# Patient Record
Sex: MALE | Race: WHITE | NOT HISPANIC OR LATINO | URBAN - METROPOLITAN AREA
[De-identification: names, ages, dates, MRNs, and addresses within clinical notes are randomized per-mention and may not be internally consistent; named-entity substitution may affect disease eponyms.]

---

## 2024-08-01 ENCOUNTER — INPATIENT (INPATIENT)
Facility: HOSPITAL | Age: 24
LOS: 0 days | Discharge: HOME | DRG: 225 | End: 2024-08-02
Attending: STUDENT IN AN ORGANIZED HEALTH CARE EDUCATION/TRAINING PROGRAM | Admitting: HOSPITALIST
Payer: MEDICAID

## 2024-08-01 VITALS
WEIGHT: 160.06 LBS | DIASTOLIC BLOOD PRESSURE: 70 MMHG | HEART RATE: 67 BPM | RESPIRATION RATE: 16 BRPM | TEMPERATURE: 98 F | OXYGEN SATURATION: 98 % | HEIGHT: 68 IN | SYSTOLIC BLOOD PRESSURE: 114 MMHG

## 2024-08-01 DIAGNOSIS — K37 UNSPECIFIED APPENDICITIS: ICD-10-CM

## 2024-08-01 LAB
ALBUMIN SERPL ELPH-MCNC: 4.8 G/DL — SIGNIFICANT CHANGE UP (ref 3.5–5.2)
ALP SERPL-CCNC: 56 U/L — SIGNIFICANT CHANGE UP (ref 30–115)
ALT FLD-CCNC: 17 U/L — SIGNIFICANT CHANGE UP (ref 0–41)
ANION GAP SERPL CALC-SCNC: 13 MMOL/L — SIGNIFICANT CHANGE UP (ref 7–14)
APTT BLD: 28.3 SEC — SIGNIFICANT CHANGE UP (ref 27–39.2)
AST SERPL-CCNC: 22 U/L — SIGNIFICANT CHANGE UP (ref 0–41)
BASOPHILS # BLD AUTO: 0.03 K/UL — SIGNIFICANT CHANGE UP (ref 0–0.2)
BASOPHILS NFR BLD AUTO: 0.2 % — SIGNIFICANT CHANGE UP (ref 0–1)
BILIRUB SERPL-MCNC: 0.9 MG/DL — SIGNIFICANT CHANGE UP (ref 0.2–1.2)
BLD GP AB SCN SERPL QL: SIGNIFICANT CHANGE UP
BUN SERPL-MCNC: 19 MG/DL — SIGNIFICANT CHANGE UP (ref 10–20)
CALCIUM SERPL-MCNC: 9.5 MG/DL — SIGNIFICANT CHANGE UP (ref 8.4–10.4)
CHLORIDE SERPL-SCNC: 101 MMOL/L — SIGNIFICANT CHANGE UP (ref 98–110)
CO2 SERPL-SCNC: 23 MMOL/L — SIGNIFICANT CHANGE UP (ref 17–32)
CREAT SERPL-MCNC: 0.9 MG/DL — SIGNIFICANT CHANGE UP (ref 0.7–1.5)
EGFR: 125 ML/MIN/1.73M2 — SIGNIFICANT CHANGE UP
EOSINOPHIL # BLD AUTO: 0.01 K/UL — SIGNIFICANT CHANGE UP (ref 0–0.7)
EOSINOPHIL NFR BLD AUTO: 0.1 % — SIGNIFICANT CHANGE UP (ref 0–8)
GLUCOSE SERPL-MCNC: 114 MG/DL — HIGH (ref 70–99)
HCT VFR BLD CALC: 38.9 % — LOW (ref 42–52)
HGB BLD-MCNC: 13.1 G/DL — LOW (ref 14–18)
IMM GRANULOCYTES NFR BLD AUTO: 0.4 % — HIGH (ref 0.1–0.3)
INR BLD: 1.1 RATIO — SIGNIFICANT CHANGE UP (ref 0.65–1.3)
LIDOCAIN IGE QN: 13 U/L — SIGNIFICANT CHANGE UP (ref 7–60)
LYMPHOCYTES # BLD AUTO: 0.72 K/UL — LOW (ref 1.2–3.4)
LYMPHOCYTES # BLD AUTO: 5.3 % — LOW (ref 20.5–51.1)
MCHC RBC-ENTMCNC: 27.8 PG — SIGNIFICANT CHANGE UP (ref 27–31)
MCHC RBC-ENTMCNC: 33.7 G/DL — SIGNIFICANT CHANGE UP (ref 32–37)
MCV RBC AUTO: 82.6 FL — SIGNIFICANT CHANGE UP (ref 80–94)
MONOCYTES # BLD AUTO: 0.61 K/UL — HIGH (ref 0.1–0.6)
MONOCYTES NFR BLD AUTO: 4.5 % — SIGNIFICANT CHANGE UP (ref 1.7–9.3)
NEUTROPHILS # BLD AUTO: 12.05 K/UL — HIGH (ref 1.4–6.5)
NEUTROPHILS NFR BLD AUTO: 89.5 % — HIGH (ref 42.2–75.2)
NRBC # BLD: 0 /100 WBCS — SIGNIFICANT CHANGE UP (ref 0–0)
PLATELET # BLD AUTO: 260 K/UL — SIGNIFICANT CHANGE UP (ref 130–400)
PMV BLD: 9.9 FL — SIGNIFICANT CHANGE UP (ref 7.4–10.4)
POTASSIUM SERPL-MCNC: 4.1 MMOL/L — SIGNIFICANT CHANGE UP (ref 3.5–5)
POTASSIUM SERPL-SCNC: 4.1 MMOL/L — SIGNIFICANT CHANGE UP (ref 3.5–5)
PROT SERPL-MCNC: 6.7 G/DL — SIGNIFICANT CHANGE UP (ref 6–8)
PROTHROM AB SERPL-ACNC: 12.5 SEC — SIGNIFICANT CHANGE UP (ref 9.95–12.87)
RBC # BLD: 4.71 M/UL — SIGNIFICANT CHANGE UP (ref 4.7–6.1)
RBC # FLD: 12.9 % — SIGNIFICANT CHANGE UP (ref 11.5–14.5)
SODIUM SERPL-SCNC: 137 MMOL/L — SIGNIFICANT CHANGE UP (ref 135–146)
WBC # BLD: 13.47 K/UL — HIGH (ref 4.8–10.8)
WBC # FLD AUTO: 13.47 K/UL — HIGH (ref 4.8–10.8)

## 2024-08-01 PROCEDURE — 44970 LAPAROSCOPY APPENDECTOMY: CPT

## 2024-08-01 PROCEDURE — 99223 1ST HOSP IP/OBS HIGH 75: CPT | Mod: 57

## 2024-08-01 PROCEDURE — 88304 TISSUE EXAM BY PATHOLOGIST: CPT

## 2024-08-01 PROCEDURE — 99285 EMERGENCY DEPT VISIT HI MDM: CPT

## 2024-08-01 PROCEDURE — C9399: CPT

## 2024-08-01 PROCEDURE — 88304 TISSUE EXAM BY PATHOLOGIST: CPT | Mod: 26

## 2024-08-01 PROCEDURE — 74177 CT ABD & PELVIS W/CONTRAST: CPT | Mod: 26,MC

## 2024-08-01 PROCEDURE — C1889: CPT

## 2024-08-01 RX ORDER — FAMOTIDINE 40 MG/1
20 TABLET, FILM COATED ORAL ONCE
Refills: 0 | Status: COMPLETED | OUTPATIENT
Start: 2024-08-01 | End: 2024-08-01

## 2024-08-01 RX ORDER — ACETAMINOPHEN 500 MG
650 TABLET ORAL EVERY 6 HOURS
Refills: 0 | Status: DISCONTINUED | OUTPATIENT
Start: 2024-08-01 | End: 2024-08-01

## 2024-08-01 RX ORDER — DEXTROSE MONOHYDRATE, SODIUM CHLORIDE, SODIUM LACTATE, CALCIUM CHLORIDE, MAGNESIUM CHLORIDE 1.5; 538; 448; 18.4; 5.08 G/100ML; MG/100ML; MG/100ML; MG/100ML; MG/100ML
1000 SOLUTION INTRAPERITONEAL
Refills: 0 | Status: DISCONTINUED | OUTPATIENT
Start: 2024-08-01 | End: 2024-08-01

## 2024-08-01 RX ORDER — KETOROLAC TROMETHAMINE 10 MG
15 TABLET ORAL EVERY 6 HOURS
Refills: 0 | Status: DISCONTINUED | OUTPATIENT
Start: 2024-08-01 | End: 2024-08-01

## 2024-08-01 RX ORDER — PIPERACILLIN SODIUM, TAZOBACTAM SODIUM 3; .375 G/15ML; G/15ML
3.38 INJECTION, POWDER, LYOPHILIZED, FOR SOLUTION INTRAVENOUS ONCE
Refills: 0 | Status: CANCELLED | OUTPATIENT
Start: 2024-08-02 | End: 2024-08-01

## 2024-08-01 RX ORDER — ONDANSETRON HCL/PF 4 MG/2 ML
4 VIAL (ML) INJECTION ONCE
Refills: 0 | Status: COMPLETED | OUTPATIENT
Start: 2024-08-01 | End: 2024-08-01

## 2024-08-01 RX ORDER — IOHEXOL 240 MG/ML
30 INJECTION, SOLUTION INTRATHECAL; INTRAVASCULAR; INTRAVENOUS; ORAL ONCE
Refills: 0 | Status: COMPLETED | OUTPATIENT
Start: 2024-08-01 | End: 2024-08-01

## 2024-08-01 RX ORDER — PIPERACILLIN SODIUM, TAZOBACTAM SODIUM 3; .375 G/15ML; G/15ML
3.38 INJECTION, POWDER, LYOPHILIZED, FOR SOLUTION INTRAVENOUS ONCE
Refills: 0 | Status: COMPLETED | OUTPATIENT
Start: 2024-08-01 | End: 2024-08-01

## 2024-08-01 RX ORDER — ONDANSETRON HCL/PF 4 MG/2 ML
4 VIAL (ML) INJECTION ONCE
Refills: 0 | Status: DISCONTINUED | OUTPATIENT
Start: 2024-08-01 | End: 2024-08-02

## 2024-08-01 RX ORDER — PIPERACILLIN SODIUM, TAZOBACTAM SODIUM 3; .375 G/15ML; G/15ML
3.38 INJECTION, POWDER, LYOPHILIZED, FOR SOLUTION INTRAVENOUS EVERY 8 HOURS
Refills: 0 | Status: CANCELLED | OUTPATIENT
Start: 2024-08-02 | End: 2024-08-01

## 2024-08-01 RX ORDER — ENOXAPARIN SODIUM 120 MG/.8ML
30 INJECTION SUBCUTANEOUS EVERY 12 HOURS
Refills: 0 | Status: DISCONTINUED | OUTPATIENT
Start: 2024-08-01 | End: 2024-08-01

## 2024-08-01 RX ORDER — BACTERIOSTATIC SODIUM CHLORIDE 0.9 %
1000 VIAL (ML) INJECTION ONCE
Refills: 0 | Status: COMPLETED | OUTPATIENT
Start: 2024-08-01 | End: 2024-08-01

## 2024-08-01 RX ORDER — DEXTROSE MONOHYDRATE, SODIUM CHLORIDE, SODIUM LACTATE, CALCIUM CHLORIDE, MAGNESIUM CHLORIDE 1.5; 538; 448; 18.4; 5.08 G/100ML; MG/100ML; MG/100ML; MG/100ML; MG/100ML
1000 SOLUTION INTRAPERITONEAL
Refills: 0 | Status: DISCONTINUED | OUTPATIENT
Start: 2024-08-01 | End: 2024-08-02

## 2024-08-01 RX ADMIN — IOHEXOL 30 MILLILITER(S): 240 INJECTION, SOLUTION INTRATHECAL; INTRAVASCULAR; INTRAVENOUS; ORAL at 15:27

## 2024-08-01 RX ADMIN — Medication 15 MILLIGRAM(S): at 20:59

## 2024-08-01 RX ADMIN — Medication 1000 MILLILITER(S): at 15:27

## 2024-08-01 RX ADMIN — PIPERACILLIN SODIUM, TAZOBACTAM SODIUM 200 GRAM(S): 3; .375 INJECTION, POWDER, LYOPHILIZED, FOR SOLUTION INTRAVENOUS at 20:59

## 2024-08-01 RX ADMIN — Medication 4 MILLIGRAM(S): at 17:17

## 2024-08-01 RX ADMIN — Medication 4 MILLIGRAM(S): at 15:26

## 2024-08-01 RX ADMIN — FAMOTIDINE 100 MILLIGRAM(S): 40 TABLET, FILM COATED ORAL at 15:26

## 2024-08-01 RX ADMIN — Medication 2 MILLIGRAM(S): at 15:26

## 2024-08-01 NOTE — ED PROVIDER NOTE - ATTENDING CONTRIBUTION TO CARE
23-year-old male with past medical history of testicular torsion surgery at age 7 years old, presents to the ED for abdominal pain that began last night and started to worsen this afternoon.  Patient states the pain started around the epigastric/periumbilical area, described as a twisting, sharp discomfort that radiated to the right lower quadrant region today.  States he is having nausea and vomiting x4, but denies any diarrhea, denies any fever/chills/dysuria/flank pain/rashes/trauma/headache/chest pain/shortness of breath.  On exam patient has positive McBurney's point tenderness, nondistended, no mass, normal bowel sounds, + mild epigastric/periumbilical discomfort.  High suspicion of appendicitis

## 2024-08-01 NOTE — DISCHARGE NOTE PROVIDER - CARE PROVIDER_API CALL
Gm Cruz Clark  Surgery  [phone] [place], [place] [id number]  Phone: [phone]  Fax: [phone]  Follow Up Time: 2 weeks

## 2024-08-01 NOTE — ED PROVIDER NOTE - PROGRESS NOTE DETAILS
JV: Patient states that he is cold and still in a lot of pain a little bit last time when he came in.  Patient states that he is unaware of receiving pain medications.  Patient is shaking and was covered in blankets, stating that he was very cold.  After receiving 2 more blankets patient still admits to being cold but a little bit better. JV: Surgery called and in trauma code so contacted over teams for CT finding of acute appendicitis.

## 2024-08-01 NOTE — ED ADULT NURSE REASSESSMENT NOTE - NS ED NURSE REASSESS COMMENT FT1
pt aox4 speaking in full sentences reporting mile right abdominal pain. pt iv intact no /ss of distress.

## 2024-08-01 NOTE — CHART NOTE - NSCHARTNOTEFT_GEN_A_CORE
PACU ANESTHESIA ADMISSION NOTE      Procedure: Laparoscopic appendectomy      Post op diagnosis:  Acute appendicitis        ____  Intubated  TV:______       Rate: ______      FiO2: ______    _x___  Patent Airway    _x___  Full return of protective reflexes    ____  Full recovery from anesthesia / back to baseline status    Vitals:            T:   98.7             BP :  117/58              R:   14           Sat:    97           P:  87      Mental Status:  _x___ Awake   _____ Alert   _____ Drowsy   _____ Sedated    Nausea/Vomiting:  _x___  NO       ______Yes,   See Post - Op Orders         Pain Scale (0-10):  __0___    Treatment: ___ None    __x__ See Post - Op/PCA Orders    Post - Operative Fluids:   __x__ Oral   ____ See Post - Op Orders    Plan: Discharge:   _x___Home       _____Floor     _____Critical Care    _____  Other:_________________    Comments:  No anesthesia issues or complications noted.  Discharge when criteria met.

## 2024-08-01 NOTE — ED PROVIDER NOTE - PHYSICAL EXAMINATION
VITAL SIGNS: I have reviewed nursing notes and confirm.  CONSTITUTIONAL: Well-appearing, non-toxic, in NAD  SKIN: Warm dry, normal skin turgor  CARD: RRR, no murmurs, rubs or gallops  RESP: Clear to ausculation bilaterally.  No rales, rhonchi, or wheezing.  ABD: Right lower quadrant tenderness to palpation. No CVA tenderness  PSYCH: Cooperative, appropriate.

## 2024-08-01 NOTE — H&P ADULT - ATTENDING COMMENTS
Trauma/Acute Care Surgery Attending Note Attestation  Patient was seen and examined bedside.  I reviewed the resident/PA note and agreed with above assessment and plan with following additions and corrections.    Histoyr as above. Has been unable to tolerate oral intake.    T(C): 36.7 (08-01-24 @ 13:54), Max: 36.7 (08-01-24 @ 13:54)  HR: 67 (08-01-24 @ 13:54) (67 - 67)  BP: 114/70 (08-01-24 @ 13:54) (114/70 - 114/70)  RR: 16 (08-01-24 @ 13:54) (16 - 16)  SpO2: 98% (08-01-24 @ 13:54) (98% - 98%)    I independently performed a medically appropriate exam. The exam was notable for tenderness to palpation in RLQ with rebound tenderness.                          13.1   13.47 )-----------( 260      ( 01 Aug 2024 15:40 )             38.9     08-01    137  |  101  |  19  ----------------------------<  114<H>  4.1   |  23  |  0.9    Ca 9.5; Mg x ; Phos x       ( 01 Aug 2024 15:40 )  Alb: 4.8 g/dL / Pro: 6.7 g/dL / AlkPhos: 56 U/L / ALT: 17 U/L / AST: 22 U/L / GGT:x     / Tbili 0.9 mg/dL/ Dbili x     / Indbili x        PT/INR/PTT - ( 01 Aug 2024 18:32 )   PT: 12.50 sec; INR: 1.10 ratio; PTT 28.3 sec    CT A/P reviewed and interpreted by me: fluid-filled dilated appendix with thickened wall and multiple appendicoliths and periappendiceal fat stranding      Assessment/Plan:  23y Male with acute appendicitis with appendicoliths. Discussed operative versus medical management. Recommended operative management due to presence of appendicoliths. Patient agreed to pursue operative management. Admit to surgery. Keep NPO with IVF. Zosyn for antibiotics. Will plan for laparoscopic appendectomy tonight when OR is available.    Gm Cruz MD  Trauma/Acute Care Surgery/Surgical Critical Care Attending

## 2024-08-01 NOTE — H&P ADULT - HISTORY OF PRESENT ILLNESS
Patient is a 23 year old male with PMH of right inguinal hernia repair as a small child who presents to the ED complaining of severe RLQ abdominal pain beginning this morning. The patient endorses nausea and multiple episodes of emesis. The patient denies fever or chills. The patient reports that this has never happened to him before in the past. The patient reports no ill or sick contacts.

## 2024-08-01 NOTE — H&P ADULT - NSHPPHYSICALEXAM_GEN_ALL_CORE
Vital Signs Last 24 Hrs  T(C): 36.7 (01 Aug 2024 13:54), Max: 36.7 (01 Aug 2024 13:54)  T(F): 98 (01 Aug 2024 13:54), Max: 98 (01 Aug 2024 13:54)  HR: 67 (01 Aug 2024 13:54) (67 - 67)  BP: 114/70 (01 Aug 2024 13:54) (114/70 - 114/70)  BP(mean): --  RR: 16 (01 Aug 2024 13:54) (16 - 16)  SpO2: 98% (01 Aug 2024 13:54) (98% - 98%)    Parameters below as of 01 Aug 2024 13:54  Patient On (Oxygen Delivery Method): room air    PHYSICAL EXAM:  GENERAL: NAD, well-appearing  CHEST/LUNG: Clear to auscultation bilaterally  HEART: Regular rate and rhythm  ABDOMEN: Soft, tenderness in the RLQ with rebound tenderness, no guarding. Nondistended;   EXTREMITIES:  No clubbing, cyanosis, or edema

## 2024-08-01 NOTE — DISCHARGE NOTE PROVIDER - NSDCHC_MEDRECSTATUS_GEN_ALL_CORE
[FreeTextEntry1] : MARIELY BENNETT  is a 47 year old F w/ pmhx of Obesity, gammopathy (following with heme) who presents today for routine follow up. The patient denies fever, chills, sore throat, loss of taste or smell, muscle aches weight loss, malaise, rash, recent travel, insect bites, alteration bowel habits, headaches, weakness, abdominal  pain, bloating, changes in urination, visual disturbances, shortness of breath, chest pain, dizziness, palpitations.\par \par Pt currently on Wegovy 1.7mg - pt reports 40lb weight loss and is now 166lbs\par \par  Admission Reconciliation is Completed  Discharge Reconciliation is Completed

## 2024-08-01 NOTE — ED PROVIDER NOTE - CLINICAL SUMMARY MEDICAL DECISION MAKING FREE TEXT BOX
23-year-old male with past medical history of testicular torsion surgery at age 7 years old, presents to the ED for abdominal pain that began last night and started to worsen this afternoon.  Patient states the pain started around the epigastric/periumbilical area, described as a twisting, sharp discomfort that radiated to the right lower quadrant region today.  States he is having nausea and vomiting x4, but denies any diarrhea, denies any fever/chills/dysuria/flank pain/rashes/trauma/headache/chest pain/shortness of breath.  On exam patient has positive McBurney's point tenderness, nondistended, no mass, normal bowel sounds, + mild epigastric/periumbilical discomfort.  High suspicion of appendicitis    Labs/urine/CT abdomen+pelvis with p.o. and IV contrast ordered.  Results confirm + uncomplicated appendicitis with no abscess or perforation.  Surgical consult requested, patient to be admitted to surgery attending Dr. Cruz.

## 2024-08-01 NOTE — H&P ADULT - ASSESSMENT
Patient is a 22 yo male with PMH inguinal hernia repair as a child presents to the ED complaining of abdominal pain for 1 day. Clinical presentation and radiographic findings consistent with acute appendicitis.     Plan  -Admit to surgical service with Dr. Cruz  -NPO, IVF  -Zosyn  -Added on to the OR for Laparoscopic Appendectomy possible open 8/1  -Consent in chart    Discussed with Surgical Attending Dr. Cruz    [phone]

## 2024-08-01 NOTE — DISCHARGE NOTE PROVIDER - HOSPITAL COURSE
23 year old male with PMH of right inguinal hernia repair as a small child who presents to the ED complaining of severe RLQ abdominal pain beginning this morning. The patient endorses nausea and multiple episodes of emesis. The patient denies fever or chills. The patient reports that this has never happened to him before in the past. The patient reports no ill or sick contacts. He was diagnosed with Acute appendicitis and taken to the OR with Dr. Cruz for laparoscopic appendectomy (please see operative report for details). Patient was seen and examined in PACU and deemed medically stable for discharge home with follow up in surgery clinic in 2 weeks.

## 2024-08-01 NOTE — H&P ADULT - NSHPLABSRESULTS_GEN_ALL_CORE
Labs:  CAPILLARY BLOOD GLUCOSE                              13.1   13.47 )-----------( 260      ( 01 Aug 2024 15:40 )             38.9       Auto Neutrophil %: 89.5 % (08-01-24 @ 15:40)  Auto Immature Granulocyte %: 0.4 % (08-01-24 @ 15:40)    08-01    137  |  101  |  19  ----------------------------<  114<H>  4.1   |  23  |  0.9      Calcium: 9.5 mg/dL (08-01-24 @ 15:40)      LFTs:             6.7  | 0.9  | 22       ------------------[56      ( 01 Aug 2024 15:40 )  4.8  | x    | 17          Lipase:13     Amylase:x             Coags:     12.50  ----< 1.10    ( 01 Aug 2024 18:32 )     28.3            Urinalysis Basic - ( 01 Aug 2024 15:40 )    Color: x / Appearance: x / SG: x / pH: x  Gluc: 114 mg/dL / Ketone: x  / Bili: x / Urobili: x   Blood: x / Protein: x / Nitrite: x   Leuk Esterase: x / RBC: x / WBC x   Sq Epi: x / Non Sq Epi: x / Bacteria: x        < from: CT Abdomen and Pelvis w/ Oral Cont and w/ IV Cont (08.01.24 @ 17:53) >    Acute appendicitis, as above, without drainable fluid collection or   pneumoperitoneum.      < end of copied text >

## 2024-08-01 NOTE — ED PROVIDER NOTE - OBJECTIVE STATEMENT
23-year-old male presenting to the ED with 2-hour right lower quadrant pain.  He states that it feels like a twisting pain that radiates to his epigastric region.  Patient states he took Tylenol but did not help at all.  Patient denies any fever or chills, flank pain, dysuria.

## 2024-08-02 VITALS
HEART RATE: 72 BPM | RESPIRATION RATE: 17 BRPM | SYSTOLIC BLOOD PRESSURE: 124 MMHG | TEMPERATURE: 99 F | DIASTOLIC BLOOD PRESSURE: 84 MMHG | OXYGEN SATURATION: 100 %

## 2024-08-12 DIAGNOSIS — K35.80 UNSPECIFIED ACUTE APPENDICITIS: ICD-10-CM

## 2025-08-02 ENCOUNTER — EMERGENCY (EMERGENCY)
Facility: HOSPITAL | Age: 25
LOS: 0 days | Discharge: HOME | End: 2025-08-02
Attending: EMERGENCY MEDICINE
Payer: MEDICAID

## 2025-08-02 VITALS
HEIGHT: 68 IN | TEMPERATURE: 98 F | RESPIRATION RATE: 18 BRPM | SYSTOLIC BLOOD PRESSURE: 143 MMHG | WEIGHT: 175.05 LBS | HEART RATE: 94 BPM | OXYGEN SATURATION: 98 % | DIASTOLIC BLOOD PRESSURE: 80 MMHG

## 2025-08-02 DIAGNOSIS — M54.50 LOW BACK PAIN, UNSPECIFIED: ICD-10-CM

## 2025-08-02 DIAGNOSIS — L05.01 PILONIDAL CYST WITH ABSCESS: ICD-10-CM

## 2025-08-02 PROCEDURE — 10080 I&D PILONIDAL CYST SIMPLE: CPT

## 2025-08-02 PROCEDURE — 99283 EMERGENCY DEPT VISIT LOW MDM: CPT | Mod: 25

## 2025-08-02 PROCEDURE — 99284 EMERGENCY DEPT VISIT MOD MDM: CPT | Mod: 25

## 2025-08-02 RX ORDER — DOXYCYCLINE HYCLATE 100 MG
1 TABLET ORAL
Qty: 14 | Refills: 0
Start: 2025-08-02 | End: 2025-08-08

## 2025-08-02 RX ORDER — BUTYROSPERMUM PARKII(SHEA BUTTER), SIMMONDSIA CHINENSIS (JOJOBA) SEED OIL, ALOE BARBADENSIS LEAF EXTRACT .01; 1; 3.5 G/100G; G/100G; G/100G
1 LIQUID TOPICAL
Qty: 10 | Refills: 0
Start: 2025-08-02 | End: 2025-08-06